# Patient Record
Sex: FEMALE | Race: ASIAN | NOT HISPANIC OR LATINO | Employment: FULL TIME | URBAN - METROPOLITAN AREA
[De-identification: names, ages, dates, MRNs, and addresses within clinical notes are randomized per-mention and may not be internally consistent; named-entity substitution may affect disease eponyms.]

---
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
## 2022-02-18 DIAGNOSIS — Z92.21 STATUS POST CHEMOTHERAPY: ICD-10-CM

## 2022-02-18 DIAGNOSIS — Z94.81 STATUS POST BONE MARROW TRANSPLANT (H): ICD-10-CM

## 2022-02-18 DIAGNOSIS — Z92.3 STATUS POST RADIATION THERAPY: ICD-10-CM

## 2022-02-18 DIAGNOSIS — D56.5 HEMOGLOBIN E-BETA THALASSEMIA (H): Primary | ICD-10-CM

## 2022-02-21 ENCOUNTER — TELEPHONE (OUTPATIENT)
Dept: CARE COORDINATION | Facility: CLINIC | Age: 22
End: 2022-02-21

## 2022-02-21 NOTE — TELEPHONE ENCOUNTER
Pediatric Long-Term Follow-Up Clinic  Social Work Telephone Contact     Data:   received a page consultation request (Re :) insurance barriers.  was notified rooming staff called Matt to update her on the visitor and masking policies. During this screening, Matt noted she would be unable to attend her visit due to lack of insurance coverage. She reported she is also still trying to pay off a bill from last year's visit. Matt was receptive to social work outreach to help assess needs.     Assessment:   attempted to call Matt but was only able to leave a voicemail. Abdir introduced herself, explained her role, and offered ongoing support services to help resolve barriers to care.     Plan:   Abdir will remain available for consultation.     TOMER Ordoñez, [facility]   Pediatric BMT & Survivorship    [email or web address]   Office: [phone]  Pager: [phone]        *NO LETTER

## 2022-03-04 ENCOUNTER — DOCUMENTATION ONLY (OUTPATIENT)
Dept: CARE COORDINATION | Facility: CLINIC | Age: 22
End: 2022-03-04

## 2022-03-04 NOTE — PROGRESS NOTES
Pediatric Long-Term Follow-Up Clinic  Social Work Progress Note    Data:  Writer e-mailed Ameily as a second outreach attempt to help resolve identified insurance barriers.     Assessment:  Please see below for a copy of this correspondence.    Plan:   Writer will remain available for consultation.     TOMER Ordoñez, [facility]   Pediatric BMT & Survivorship    [email or web address]   Office: [phone]  Pager: [phone]      *NO LETTER    ____________________________________________________________

## 2022-03-22 ENCOUNTER — DOCUMENTATION ONLY (OUTPATIENT)
Dept: CARE COORDINATION | Facility: CLINIC | Age: 22
End: 2022-03-22

## 2022-03-22 NOTE — PROGRESS NOTES
Pediatric Long-Term Follow-Up Clinic  Social Work Progress Note    Data:   received a SW consultation request (Re :) insurance and billing concerns.  was notified LTFU schedulers completed outreach and learned from Matt that she has an outstanding bill from last year and is currently uninsured. Although  previously completed outreach via phone and e-mail,  learned Matt has been utilizing My Chart messaging.    Assessment:    (03/22/22)  sent a My Chart message offering support services and asking for permission to contact billing to understand why there is an outstanding balance. Matt identified a preference to communicate via e-mail moving forward.      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 Although she was able to enroll in dental and vision through her employer, she won't be able to add a health plan until next year as she missed the open enrollment period. Matt noted she is receptive to insurance resources or referrals and additionally provided permission for jessier to contact billing.       (03/28/22)  responded with information on applying for medical assistance or purchasing an insurance plan on the [employer] marketplace. (See below for a copy)     Plan:   Writer will provide support services to eliminate barriers to care.     TOMER Ordoñez, [facility]   Pediatric BMT & Survivorship    [email or web address]   Office: [phone]  Pager: [phone]      *NO LETTER    ______________________________________________________________________________

## 2022-04-06 ENCOUNTER — DOCUMENTATION ONLY (OUTPATIENT)
Dept: CARE COORDINATION | Facility: CLINIC | Age: 22
End: 2022-04-06

## 2022-04-06 NOTE — PROGRESS NOTES
Pediatric Long-Term Follow-Up Clinic  Social Work Progress Note    Data:  With Matt's permission, writer contacted [employer] to discuss her outstanding balance. A  noted there were two existing bills that were never submitted to insurance, which will now be re-submitted. However, the remainder was successfully processed, and the balance was due to insurance coverage.     Writer will seek  funding assistance to pay off the existing bills to eliminate identified barriers to care.     Assessment:  Writer e-mailed Matt pertinent updates. See below for a copy of this correspondence.     Plan:   Writer will remain available for consultation.     TOMER Ordoñez, [facility]   Pediatric BMT & Survivorship    [email or web address]   Office: [phone]  Pager: [phone]      *NO LETTER  __________________________________________________________________

## 2022-08-20 ENCOUNTER — HEALTH MAINTENANCE LETTER (OUTPATIENT)
Age: 22
End: 2022-08-20

## 2022-09-02 ENCOUNTER — DOCUMENTATION ONLY (OUTPATIENT)
Dept: CARE COORDINATION | Facility: CLINIC | Age: 22
End: 2022-09-02

## 2022-09-02 NOTE — PROGRESS NOTES
[facility]  Childhood Cancer Survivor Program (cCSP)  Social Work Telephone Contact      Data:   has yet to receive return e-mail correspondence from Matt (Re :) insurance barriers. On 08/23, she was a no call for her scheduled Long-Term Follow-Up appointment. However, a medical chart review indicates her insurance coverage is active again.     Assessment:  Writer reached out via e-mail (as previously requested as a preferred method of communication) to offer support with any remaining insurance and billing concerns. Please see below for a copy of this correspondence.     Plan:   Writer will remain available for consultation.     TOMER Ordoñez, [facility]   Pediatric BMT & Survivorship    [email or web address]   Office: [phone]  Pager: [phone]        *NO LETTER  ________________________________________________

## 2022-11-20 ENCOUNTER — HEALTH MAINTENANCE LETTER (OUTPATIENT)
Age: 22
End: 2022-11-20

## 2023-09-16 ENCOUNTER — HEALTH MAINTENANCE LETTER (OUTPATIENT)
Age: 23
End: 2023-09-16

## 2025-06-07 ENCOUNTER — HOSPITAL ENCOUNTER (EMERGENCY)
Facility: HOSPITAL | Age: 25
Discharge: HOME | End: 2025-06-07
Attending: STUDENT IN AN ORGANIZED HEALTH CARE EDUCATION/TRAINING PROGRAM | Admitting: STUDENT IN AN ORGANIZED HEALTH CARE EDUCATION/TRAINING PROGRAM
Payer: COMMERCIAL

## 2025-06-07 VITALS
BODY MASS INDEX: 24.8 KG/M2 | SYSTOLIC BLOOD PRESSURE: 92 MMHG | DIASTOLIC BLOOD PRESSURE: 59 MMHG | TEMPERATURE: 98 F | HEART RATE: 85 BPM | OXYGEN SATURATION: 99 % | RESPIRATION RATE: 24 BRPM | WEIGHT: 140 LBS

## 2025-06-07 DIAGNOSIS — F10.920 ALCOHOLIC INTOXICATION WITHOUT COMPLICATION: ICD-10-CM

## 2025-06-07 LAB
ALBUMIN SERPL BCG-MCNC: 4.9 G/DL (ref 3.5–5.2)
ALP SERPL-CCNC: 58 U/L (ref 40–150)
ALT SERPL W P-5'-P-CCNC: 15 U/L (ref 0–50)
ANION GAP SERPL CALCULATED.3IONS-SCNC: 14 MMOL/L (ref 7–15)
AST SERPL W P-5'-P-CCNC: 27 U/L (ref 0–45)
ATRIAL RATE - MUSE: 93 BPM
BASOPHILS # BLD AUTO: 0.1 10E3/UL (ref 0–0.2)
BASOPHILS NFR BLD AUTO: 0 %
BILIRUB SERPL-MCNC: 0.8 MG/DL
BUN SERPL-MCNC: 10.8 MG/DL (ref 6–20)
CALCIUM SERPL-MCNC: 9.2 MG/DL (ref 8.8–10.4)
CHLORIDE SERPL-SCNC: 101 MMOL/L (ref 98–107)
CREAT SERPL-MCNC: 0.64 MG/DL (ref 0.51–0.95)
DACRYOCYTES BLD QL SMEAR: SLIGHT
DIASTOLIC BLOOD PRESSURE - MUSE: NORMAL MMHG
EGFRCR SERPLBLD CKD-EPI 2021: >90 ML/MIN/1.73M2
ELLIPTOCYTES BLD QL SMEAR: SLIGHT
EOSINOPHIL # BLD AUTO: 0.1 10E3/UL (ref 0–0.7)
EOSINOPHIL NFR BLD AUTO: 0 %
ERYTHROCYTE [DISTWIDTH] IN BLOOD BY AUTOMATED COUNT: 19.8 % (ref 10–15)
ETHANOL SERPL-MCNC: 0.18 G/DL
FRAGMENTS BLD QL SMEAR: ABNORMAL
GLUCOSE SERPL-MCNC: 94 MG/DL (ref 70–99)
HCO3 SERPL-SCNC: 22 MMOL/L (ref 22–29)
HCT VFR BLD AUTO: 39 % (ref 35–47)
HGB BLD-MCNC: 12.1 G/DL (ref 11.7–15.7)
IMM GRANULOCYTES # BLD: 0.1 10E3/UL
IMM GRANULOCYTES NFR BLD: 1 %
INTERPRETATION ECG - MUSE: NORMAL
LYMPHOCYTES # BLD AUTO: 1.8 10E3/UL (ref 0.8–5.3)
LYMPHOCYTES NFR BLD AUTO: 16 %
MAGNESIUM SERPL-MCNC: 2 MG/DL (ref 1.7–2.3)
MCH RBC QN AUTO: 18.6 PG (ref 26.5–33)
MCHC RBC AUTO-ENTMCNC: 31 G/DL (ref 31.5–36.5)
MCV RBC AUTO: 60 FL (ref 78–100)
MONOCYTES # BLD AUTO: 0.5 10E3/UL (ref 0–1.3)
MONOCYTES NFR BLD AUTO: 4 %
NEUTROPHILS # BLD AUTO: 8.7 10E3/UL (ref 1.6–8.3)
NEUTROPHILS NFR BLD AUTO: 78 %
NRBC # BLD AUTO: 0 10E3/UL
NRBC BLD AUTO-RTO: 0 /100
P AXIS - MUSE: 70 DEGREES
PLAT MORPH BLD: ABNORMAL
PLATELET # BLD AUTO: 191 10E3/UL (ref 150–450)
POTASSIUM SERPL-SCNC: 3.8 MMOL/L (ref 3.4–5.3)
PR INTERVAL - MUSE: 152 MS
PROT SERPL-MCNC: 7.9 G/DL (ref 6.4–8.3)
QRS DURATION - MUSE: 82 MS
QT - MUSE: 386 MS
QTC - MUSE: 479 MS
R AXIS - MUSE: 72 DEGREES
RBC # BLD AUTO: 6.51 10E6/UL (ref 3.8–5.2)
RBC MORPH BLD: ABNORMAL
SODIUM SERPL-SCNC: 137 MMOL/L (ref 135–145)
SYSTOLIC BLOOD PRESSURE - MUSE: NORMAL MMHG
T AXIS - MUSE: 68 DEGREES
TARGETS BLD QL SMEAR: SLIGHT
TROPONIN T SERPL HS-MCNC: <6 NG/L
VENTRICULAR RATE- MUSE: 93 BPM
WBC # BLD AUTO: 11.2 10E3/UL (ref 4–11)

## 2025-06-07 PROCEDURE — 36415 COLL VENOUS BLD VENIPUNCTURE: CPT | Performed by: STUDENT IN AN ORGANIZED HEALTH CARE EDUCATION/TRAINING PROGRAM

## 2025-06-07 PROCEDURE — 93005 ELECTROCARDIOGRAM TRACING: CPT | Performed by: STUDENT IN AN ORGANIZED HEALTH CARE EDUCATION/TRAINING PROGRAM

## 2025-06-07 PROCEDURE — 85025 COMPLETE CBC W/AUTO DIFF WBC: CPT | Performed by: STUDENT IN AN ORGANIZED HEALTH CARE EDUCATION/TRAINING PROGRAM

## 2025-06-07 PROCEDURE — 84484 ASSAY OF TROPONIN QUANT: CPT | Performed by: STUDENT IN AN ORGANIZED HEALTH CARE EDUCATION/TRAINING PROGRAM

## 2025-06-07 PROCEDURE — 82077 ASSAY SPEC XCP UR&BREATH IA: CPT | Performed by: STUDENT IN AN ORGANIZED HEALTH CARE EDUCATION/TRAINING PROGRAM

## 2025-06-07 PROCEDURE — 99284 EMERGENCY DEPT VISIT MOD MDM: CPT

## 2025-06-07 PROCEDURE — 82435 ASSAY OF BLOOD CHLORIDE: CPT | Performed by: STUDENT IN AN ORGANIZED HEALTH CARE EDUCATION/TRAINING PROGRAM

## 2025-06-07 PROCEDURE — 83735 ASSAY OF MAGNESIUM: CPT | Performed by: STUDENT IN AN ORGANIZED HEALTH CARE EDUCATION/TRAINING PROGRAM

## 2025-06-07 RX ORDER — DROPERIDOL 2.5 MG/ML
0.62 INJECTION, SOLUTION INTRAMUSCULAR; INTRAVENOUS ONCE
Status: DISCONTINUED | OUTPATIENT
Start: 2025-06-07 | End: 2025-06-07

## 2025-06-07 ASSESSMENT — COLUMBIA-SUICIDE SEVERITY RATING SCALE - C-SSRS
2. HAVE YOU ACTUALLY HAD ANY THOUGHTS OF KILLING YOURSELF IN THE PAST MONTH?: NO
6. HAVE YOU EVER DONE ANYTHING, STARTED TO DO ANYTHING, OR PREPARED TO DO ANYTHING TO END YOUR LIFE?: NO
1. IN THE PAST MONTH, HAVE YOU WISHED YOU WERE DEAD OR WISHED YOU COULD GO TO SLEEP AND NOT WAKE UP?: NO

## 2025-06-07 ASSESSMENT — ACTIVITIES OF DAILY LIVING (ADL)
ADLS_ACUITY_SCORE: 41
ADLS_ACUITY_SCORE: 41

## 2025-06-07 NOTE — ED TRIAGE NOTES
Pt brought in by EMS. Pt was drinking alcohol at an event at [facility] [employer] [facility]. On the way home, pt started vomiting and having a panic attack. Pt states she's a history of panic attacks. Pt denies any drug use.     Triage Assessment (Adult)       Row Name 06/07/25 0037          Triage Assessment    Airway WDL WDL        Respiratory WDL    Respiratory WDL WDL        Skin Circulation/Temperature WDL    Skin Circulation/Temperature WDL WDL        Cardiac WDL    Cardiac WDL WDL        Peripheral/Neurovascular WDL    Peripheral Neurovascular WDL WDL        Cognitive/Neuro/Behavioral WDL    Cognitive/Neuro/Behavioral WDL WDL

## 2025-06-07 NOTE — ED NOTES
Pt presents appearing to have a panic attack. SKIN: NWD.   HEENT: normocephalic, PERRL, clear x 3.   CHEST: symmetrical rise, CEBBS, pt now c/o chest pain and SOB. Pt is tachypneic.  ABD: NTND, no N&V or diarrhea.  EXT: CARRILLO x 4  Rest of exam unremarkable.

## 2025-06-07 NOTE — ED PROVIDER NOTES
"  Emergency Department Encounter         FINAL IMPRESSION:  Alcohol intoxication, anxiety reaction          ED COURSE AND MEDICAL DECISION MAKING       ED Course as of 06/07/25 0456   Sat Jun 07, 2025   0037 24-year-old with a history of thalassemia, ITP per chart review, here via EMS with multiple symptoms.  History is quite limited as patient continues to sob in the room and cry.  She was apparently at a concert tonight, and while driving home started vomiting.  Then went into what EMS as well as people on scene stated was a \"panic attack.\"  History is otherwise limited.  I walked in the room, patient was uncontrollably crying and screaming.  Intermittently will answer questions.  Denies any pain.  Maintaining her airway.  Vitals are stable other than tachycardic.  Lungs are clear.  Unable to obtain any more information.  Will obtain basic labs, as well as give medicine to help her calm down.  She seems quite uncomfortable.  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   0120 Recheck on patient, she is now alert and oriented.  Does seem intoxicated.  Friends at bedside states that she is intoxicated however looks much better.  Patient declining any medication.  Plan for basic blood work most likely discharge home.   0136 EKG sinus 93, QTc 479, no signs acute ischemia no inversions no depressions no STEMI, overall unchanged from June 2018.   0224 Reevaluated and updated the patient with findings. We discussed the plan for discharge and the patient is agreeable. Reviewed supportive cares, symptomatic treatment, outpatient follow up, and reasons to return to the Emergency Department. Patient to be discharged by ED RN.    Labs reassuring.  Reevaluation of patient showing a resting comfortably.  Feels well.  Tolerating p.o.  Alcohol level elevated on blood work.  Does have a sober ride home.  Vitals are stable.  Patient back to " baseline      Medical Decision Making      Discharge. No recommendations on prescription strength medication(s). See documentation for any additional details.    MIPS (CTPE, Dental pain, Carey, Sinusitis, Asthma/COPD, Head Trauma): Not Applicable    SEPSIS: None          MEDICATIONS GIVEN IN THE EMERGENCY DEPARTMENT:  Medications   droPERidol (INAPSINE) injection 0.625 mg (has no administration in time range)       NEW PRESCRIPTIONS STARTED AT TODAY'S ED VISIT:  New Prescriptions    No medications on file       HPI     Patient information obtained from: EMS and Patient    Use of : N/A    LIMITED HISTORY BY CRYING.    Matt Loaiza is a 24 year old female with a pertinent history of s/p chemotherapy, s/p radiation therapy, thalassemia, and ITP who presents to this ED via ambulance for evaluation of panic attack.    Per EMS, patient was at a concert. She had some alcohol there and endorsed an episode of emesis. While on the drive home, patient started to have a panic attack. She has never felt this way before and states this feels nothing like her anxiety and panic attacks. When asked about what happened, patient reports she does not remember. Patient wants to go home. Friend of patient called 911.      MEDICAL HISTORY     Past Medical History:   Diagnosis Date    Bone marrow transplant     Hemoglobin E-beta thalassemia (H)     ITP (idiopathic thrombocytopaenic purpura)     Thalassemias        Past Surgical History:   Procedure Laterality Date    ENHANCE ENDOLYMPHATIC SAC, DEC SIGMOID SINUS, EXTND FACIAL RECESS APPRCH, MASTOIDECTOMY, BMT, COMBO      INSERT PICC LINE  10/19/2012    Procedure: INSERT PICC LINE;  Insert Picc Line;  Surgeon: Alex Thompson MD;  [facility]    INSERT PICC LINE CHILD  9/29/2012    Procedure: INSERT PICC LINE CHILD;  PICC line placement with C arm;  Surgeon: Alex Ribeiro MD;  [facility]    NASAL CAUTERIZATION  10/19/2012    Procedure: NASAL CAUTERIZATION;;   Surgeon: Ja Mccloud MD;  [facility] OR       Social History     Tobacco Use    Smoking status: Never    Smokeless tobacco: Never   Substance Use Topics    Alcohol use: No    Drug use: No       No current outpatient medications on file.          PHYSICAL EXAM     There were no vitals taken for this visit.      PHYSICAL EXAM:     General: Patient crying and screaming  HEENT: Moist mucous membranes,  No head trauma.    Cardiovascular: Normal rate, normal rhythm, no extremity edema.  No appreciable murmur.  Respiratory: No signs of respiratory distress, lungs are clear to auscultation bilaterally with no wheezes rhonchi or rales.  Abdominal: Soft, nontender, nondistended, no palpable masses, no guarding, no rebound  Musculoskeletal: Full range of motion of joints, no deformities appreciated.  Neurological: Alert, will not answer oriented questions, grossly neurologically intact.  Psychological: Normal affect and mood.  Integument: No rashes appreciated          RESULTS       Labs Ordered and Resulted from Time of ED Arrival to Time of ED Departure - No data to display    No orders to display         PROCEDURES:  Procedures:  Procedures       IReema am serving as a scribe to document services personally performed by Alex Tolbert DO, based on my observations and the provider's statements to me.  I, Alex Tolbert DO, attest that Reema Caruso is acting in a scribe capacity, has observed my performance of the services and has documented them in accordance with my direction.    Alex Tolbert DO  Emergency Medicine  [facility] EMERGENCY DEPARTMENT     Alex Tolbert DO  06/07/25 0457

## 2025-06-09 NOTE — IP AVS SNAPSHOT
[facility] Pediatric Medical Surgical Unit 6    [address]    [place] [address]    Phone:  [phone]                                       After Visit Summary   6/28/2018    Matt Loaiza    MRN: [id number]           After Visit Summary Signature Page     I have received my discharge instructions, and my questions have been answered. I have discussed any challenges I see with this plan with the nurse or doctor.    ..........................................................................................................................................  Patient/Patient Representative Signature      ..........................................................................................................................................  Patient Representative Print Name and Relationship to Patient    ..................................................               ................................................  Date                                            Time    ..........................................................................................................................................  Reviewed by Signature/Title    ...................................................              ..............................................  Date                                                            Time           No (0)

## 2025-06-21 ENCOUNTER — HEALTH MAINTENANCE LETTER (OUTPATIENT)
Age: 25
End: 2025-06-21